# Patient Record
Sex: MALE | Race: WHITE
[De-identification: names, ages, dates, MRNs, and addresses within clinical notes are randomized per-mention and may not be internally consistent; named-entity substitution may affect disease eponyms.]

---

## 2019-07-10 ENCOUNTER — HOSPITAL ENCOUNTER (OUTPATIENT)
Dept: HOSPITAL 47 - OR | Age: 44
Discharge: HOME | End: 2019-07-10
Attending: OTOLARYNGOLOGY
Payer: COMMERCIAL

## 2019-07-10 VITALS — RESPIRATION RATE: 18 BRPM

## 2019-07-10 VITALS — DIASTOLIC BLOOD PRESSURE: 83 MMHG | SYSTOLIC BLOOD PRESSURE: 159 MMHG

## 2019-07-10 VITALS — BODY MASS INDEX: 30.4 KG/M2

## 2019-07-10 VITALS — TEMPERATURE: 98 F

## 2019-07-10 VITALS — HEART RATE: 86 BPM

## 2019-07-10 DIAGNOSIS — J34.3: ICD-10-CM

## 2019-07-10 DIAGNOSIS — J33.8: ICD-10-CM

## 2019-07-10 DIAGNOSIS — Z79.52: ICD-10-CM

## 2019-07-10 DIAGNOSIS — Z79.891: ICD-10-CM

## 2019-07-10 DIAGNOSIS — J32.8: ICD-10-CM

## 2019-07-10 DIAGNOSIS — Z79.2: ICD-10-CM

## 2019-07-10 DIAGNOSIS — J34.2: Primary | ICD-10-CM

## 2019-07-10 DIAGNOSIS — Z79.899: ICD-10-CM

## 2019-07-10 DIAGNOSIS — J30.1: ICD-10-CM

## 2019-07-10 DIAGNOSIS — R42: ICD-10-CM

## 2019-07-10 DIAGNOSIS — Z88.1: ICD-10-CM

## 2019-07-10 DIAGNOSIS — J34.89: ICD-10-CM

## 2019-07-10 DIAGNOSIS — Z79.1: ICD-10-CM

## 2019-07-10 DIAGNOSIS — J35.2: ICD-10-CM

## 2019-07-10 PROCEDURE — 88300 SURGICAL PATH GROSS: CPT

## 2019-07-10 PROCEDURE — 42831 REMOVAL OF ADENOIDS: CPT

## 2019-07-10 PROCEDURE — 31255 NSL/SINS NDSC W/TOT ETHMDCT: CPT

## 2019-07-10 PROCEDURE — 31240 NSL/SNS NDSC CNCH BULL RESCJ: CPT

## 2019-07-10 PROCEDURE — 88305 TISSUE EXAM BY PATHOLOGIST: CPT

## 2019-07-10 PROCEDURE — 31267 ENDOSCOPY MAXILLARY SINUS: CPT

## 2019-07-10 PROCEDURE — 30140 RESECT INFERIOR TURBINATE: CPT

## 2019-07-10 PROCEDURE — 30520 REPAIR OF NASAL SEPTUM: CPT

## 2019-07-10 RX ADMIN — HYDROMORPHONE HYDROCHLORIDE PRN MG: 1 INJECTION, SOLUTION INTRAMUSCULAR; INTRAVENOUS; SUBCUTANEOUS at 11:18

## 2019-07-10 RX ADMIN — HYDROMORPHONE HYDROCHLORIDE PRN MG: 1 INJECTION, SOLUTION INTRAMUSCULAR; INTRAVENOUS; SUBCUTANEOUS at 10:49

## 2019-07-10 RX ADMIN — OXYMETAZOLINE HCL ONE SPRAY: 0.05 SPRAY NASAL at 08:30

## 2019-07-10 RX ADMIN — OXYMETAZOLINE HCL ONE SPRAY: 0.05 SPRAY NASAL at 08:35

## 2019-07-10 RX ADMIN — HYDROMORPHONE HYDROCHLORIDE PRN MG: 1 INJECTION, SOLUTION INTRAMUSCULAR; INTRAVENOUS; SUBCUTANEOUS at 10:54

## 2019-07-10 RX ADMIN — OXYMETAZOLINE HCL ONE SPRAY: 0.05 SPRAY NASAL at 08:40

## 2019-07-10 RX ADMIN — HYDROMORPHONE HYDROCHLORIDE PRN MG: 1 INJECTION, SOLUTION INTRAMUSCULAR; INTRAVENOUS; SUBCUTANEOUS at 11:03

## 2019-07-10 RX ADMIN — OXYMETAZOLINE HCL ONE SPRAY: 0.05 SPRAY NASAL at 08:25

## 2019-07-10 RX ADMIN — OXYMETAZOLINE HCL ONE SPRAY: 0.05 SPRAY NASAL at 08:20

## 2019-07-10 NOTE — P.OP
Date of Procedure: 07/10/19


Preoperative Diagnosis: 


Deviated nasal septum


Inferior turbinate hypertrophy


Adenoid hypertrophy


Chronic sinusitis


Postoperative Diagnosis: 


Same


Procedure(s) Performed: 


Septoplasty


Outfracture and submucous resection of the inferior turbinates


Adenoidectomy


Bilateral endoscopic sinus surgery including bile max antrostomy with sinuses, 

bilateral anterior posterior ethmoidectomy, bilateral little bullectomy


Anesthesia: SERENITYA


Surgeon: Hi Madrigal


Estimated Blood Loss (ml): 10


Pathology: other (Nasal septal bone and cartilage, sinus contents)


Condition: stable


Disposition: PACU


Indications for Procedure: 


The 44-year-old white male who has had was with chronic nasal airway obstruction

congestion and chronic sinusitis recurrent sinusitis with abnormal computed 

tomography scan showing evidence of chronic sinusitis


Operative Findings: 


Nasal septum deviated to the right also a spur to the left posteriorly inferior 

turbinate hypertrophy bilaterally.  Bilateral little bullosae cells, small 

polyps in the maxillary sinuses bilaterally mucosal thickening throughout the 

ethmoid air cells, adenoid hypertrophy


Description of Procedure: 


The patient was brought in the operative suite and placed in a supine position. 

The patient prepped and draped in usual aseptic fashion.  Orbits were in the 

operating field for monitoring to the case and computed tomography scan was on 

the computer screen for review throughout the case also.  1% lidocaine with 

100,000 epinephrine was infused submucosally both sides nasal septum as well as 

lateral nasal walls anterior tips the middle turbinates bilaterally.  While this

taking vasoconstrictive effect the inferior turbinates were infractured with 

Terral elevator partial submucous resection inferior turbinates was performed 

with Coblation device ablating a portion of the submucosal soft tissue and then 

outfractured with Terral elevator.  A left hemitransfixion incision was made with

the mucoperichondrial medial pressure flap on the left elevated.  Bony 

cartilaginous junction was disarticulated and the mucosal periosteal flap on the

right was elevated.  Bony nasal septal deformities were removed Noel 

forceps.  An inferior cartilaginous strip was removed leaving a full 1.5 cm 

caudal strut.  This corrected the nasoseptal deformities and the hemitransfixion

incision was closed with a running 4-0 chromic suture.





Full 0 endoscopic examination is performed bilaterally.  Being on the left the 

middle turbinate was medialized and the little bullectomy was performed with the

microdebrider removing the lateral one half of the middle turbinate.  Maxillary 

ostium was located with a ballpoint probe and infundibulotomy was performed 

followed by uncinectomy.  The maxillary ostium was enlarged at the expense of 

the anterior posterior fontanelle taking care anteriorly not to injure the 

lacrimal bone.  Maxillary sinus was explored and small polyps removed with 

giraffe forceps.  Anterior and posterior ethmoidectomy was then performed from 

anterior posterior with the microdebrider and up-biting and straight Blakesley 

forceps.  Once this was completed attention was turned to the right where the 

procedures were followed as they were on the left including medialization middle

turbinate little bullectomy infundibulotomy uncinectomy maxillary antrostomy 

with removal of tissue from maxillary sinus anterior and posterior 

ethmoidectomy.  Once this was completed standard nasal pore nasal dressing was 

placed the middle meatus under direct visualization and bilateral Dias airway 

splints coated bacitracin ointment were placed in nasal cavities and sutured 

trans-septally.





The McIvor mouth gag was then placed.  Soft palate palpated and no submucous 

cleft was noted.  Red Solano catheters placed through the right nasal cavity 

and pulled through the oropharynx for soft palate retraction.  The nasopharynx 

was examined mirror exam and the adenoids were ablated with suction cautery as 

they were mildly to moderately enlarged.  Good hemostasis was noted and the 

patient was suctioned in oral gastric fashion.  The McIvor mouth gag and 

catheter removed.  The patient was allowed to emerge from general anesthesia 

having tolerated procedure well was extubated in the operating suite and 

transferred to postop recovery area in satisfactory fashion.

## 2021-02-23 ENCOUNTER — HOSPITAL ENCOUNTER (OUTPATIENT)
Dept: HOSPITAL 47 - SLEEP | Age: 46
Discharge: HOME | End: 2021-02-23
Attending: INTERNAL MEDICINE
Payer: COMMERCIAL

## 2021-02-23 DIAGNOSIS — F17.203: ICD-10-CM

## 2021-02-23 DIAGNOSIS — M25.569: ICD-10-CM

## 2021-02-23 DIAGNOSIS — G89.29: ICD-10-CM

## 2021-02-23 DIAGNOSIS — M54.2: ICD-10-CM

## 2021-02-23 DIAGNOSIS — G47.33: Primary | ICD-10-CM

## 2021-02-23 PROCEDURE — 99211 OFF/OP EST MAY X REQ PHY/QHP: CPT

## 2023-10-30 NOTE — CONS
CONSULTATION



REASON FOR CONSULTATION:

Sleep apnea.



This patient is a 45-year-old  referred to me for sleep apnea

evaluation.  The patient has loud snoring and witnessed apneas as noted by his wife,

and his snoring gets worse upon drinking beer.  In general he drinks around 20 beers on

a weekly basis and he has noted worsening in his apneas and snoring while drinking.  He

wakes up excessively fatigued and tired.  He goes to bed around 8 p.m., wakes up at

4:15 a.m. in the morning to an alarm. On weekends he sleeps between 10 p.m. and 6 a.m.

in the morning.  He wakes up a few times in the middle of night, probably twice to

utilize the bathroom.  His weight has been stable around 190 pounds over the past 10

years at least.  No sleep paralysis.  No hallucinations.  No cataplexy.  No history of

any motor vehicle accident because of feeling drowsy or sleepy.  He sleeps in different

body positions and does not have any preference.  He does not take any naps during the

day.  His Depue score is 6.



PAST MEDICAL HISTORY:

Osteoarthritis of the knees, cervical disc disease with chronic neck pain.



PAST SURGICAL HISTORY:

Past surgical history includes bilateral knee surgeries for meniscal tears, hemorrhoid

surgery and sinus surgery.



DRUG ALLERGIES:

NOT KNOWN.



OUTPATIENT MEDICATIONS:

Naprosyn 500 mg twice a day and hydrocodone 5/325 one tablet every 4 to 6 hours on a

p.r.n. basis.



SOCIAL HISTORY:

He chews tobacco.  No history of alcoholism. No history of IV drugs. Drinks one cup of

coffee in the morning.



FAMILY HISTORY:

Negative for sleep apnea.  Father and mother are both healthy.



REVIEW OF SYSTEMS:

Fourteen-point review of systems was done. Positive findings are all mentioned in the

history of present illness.  No history of grinding of the teeth.  No sleepwalking or

sleeptalking.  No anxiety.  No depression.  No claustrophobia.  No head trauma.  No

restlessness in the lower extremities.  No sleep paralysis.  No hallucinations.



PHYSICAL EXAMINATION:

VITAL SIGNS: BP is 145/93, pulse 75, respiratory rate 16, temperature 97.7, saturation

97% on room air. Depue score is 6.  Neck size is 17 inches. BMI 31.0. Weight is 204,

height is 5 feet 8 inches.

GENERAL APPEARANCE:  Calm, comfortable.

HEAD: Atraumatic, normocephalic.

NECK:  Supple.  No JVD.  No goiter or neck masses. Mallampati class IV.

LUNGS:  Clear to auscultation.

HEART:  Heart sounds are regular rate and rhythm.  Normal S1, S2.  No S3, S4.  No

murmurs.

ABDOMEN:  Soft, nontender.  No organomegaly.

EXTREMITIES:  No edema.  No cyanosis or clubbing.

NEUROLOGIC:  Awake and alert. There is no focal neurological deficit.



IMPRESSION:

1. Chronic fatigue and hypersomnia with an Depue score of 6.  High suspicion for

    obstructive sleep apnea, as the patient has loud snoring, witnessed apneas, and has

    a Mallampati class IV. His current body mass index is 31.0.

2. Chronic neck pain.

3. Chronic knee pain.

4. Chronic tobacco chewing.



PLAN:

1. Proceed with a home sleep study to evaluate the patient for sleep apnea.

2. Reduce the beer drinking, especially late in the afternoon or early evening hours.

3. Encourage weight loss.

4. Sleep hygiene measures are in general acceptable in this patient.

5. Will report back the results of the home sleep study and decide if further

    treatment is needed regarding obstructive sleep apnea.  There is a high likelihood

    that the patient may have an underlying sleep breathing disorder.  Will continue to

    follow.





MMODL / IJN: [id number] / Job#: [id number] Pharmacy faxed in a request for prior authorization on:    Medication: Semaglutide 3 MG Tab  Dosage:   Quantity requested:  30  Pharmacy for prescription has been selected.    Initiation of prior authorization needed.